# Patient Record
Sex: FEMALE | Race: OTHER | NOT HISPANIC OR LATINO
[De-identification: names, ages, dates, MRNs, and addresses within clinical notes are randomized per-mention and may not be internally consistent; named-entity substitution may affect disease eponyms.]

---

## 2020-03-24 ENCOUNTER — TRANSCRIPTION ENCOUNTER (OUTPATIENT)
Age: 60
End: 2020-03-24

## 2020-07-24 ENCOUNTER — APPOINTMENT (OUTPATIENT)
Dept: CARDIOLOGY | Facility: CLINIC | Age: 60
End: 2020-07-24

## 2021-01-26 ENCOUNTER — INPATIENT (INPATIENT)
Facility: HOSPITAL | Age: 61
LOS: 0 days | Discharge: HOME | DRG: 343 | End: 2021-01-27
Attending: SURGERY | Admitting: SURGERY
Payer: COMMERCIAL

## 2021-01-26 VITALS — HEIGHT: 63 IN | WEIGHT: 110.01 LBS

## 2021-01-26 DIAGNOSIS — Z88.0 ALLERGY STATUS TO PENICILLIN: ICD-10-CM

## 2021-01-26 DIAGNOSIS — K35.80 UNSPECIFIED ACUTE APPENDICITIS: ICD-10-CM

## 2021-01-26 DIAGNOSIS — K35.30 ACUTE APPENDICITIS WITH LOCALIZED PERITONITIS, WITHOUT PERFORATION OR GANGRENE: ICD-10-CM

## 2021-01-26 DIAGNOSIS — Z98.891 HISTORY OF UTERINE SCAR FROM PREVIOUS SURGERY: Chronic | ICD-10-CM

## 2021-01-26 LAB
ALBUMIN SERPL ELPH-MCNC: 4 G/DL — SIGNIFICANT CHANGE UP (ref 3.3–5)
ALP SERPL-CCNC: 88 U/L — SIGNIFICANT CHANGE UP (ref 40–120)
ALT FLD-CCNC: 26 U/L — SIGNIFICANT CHANGE UP (ref 12–78)
ANION GAP SERPL CALC-SCNC: 4 MMOL/L — LOW (ref 5–17)
APPEARANCE UR: CLEAR — SIGNIFICANT CHANGE UP
AST SERPL-CCNC: 17 U/L — SIGNIFICANT CHANGE UP (ref 15–37)
BASOPHILS # BLD AUTO: 0.03 K/UL — SIGNIFICANT CHANGE UP (ref 0–0.2)
BASOPHILS NFR BLD AUTO: 0.3 % — SIGNIFICANT CHANGE UP (ref 0–2)
BILIRUB SERPL-MCNC: 0.4 MG/DL — SIGNIFICANT CHANGE UP (ref 0.2–1.2)
BILIRUB UR-MCNC: NEGATIVE — SIGNIFICANT CHANGE UP
BUN SERPL-MCNC: 23 MG/DL — SIGNIFICANT CHANGE UP (ref 7–23)
CALCIUM SERPL-MCNC: 9.6 MG/DL — SIGNIFICANT CHANGE UP (ref 8.5–10.1)
CHLORIDE SERPL-SCNC: 107 MMOL/L — SIGNIFICANT CHANGE UP (ref 96–108)
CO2 SERPL-SCNC: 27 MMOL/L — SIGNIFICANT CHANGE UP (ref 22–31)
COLOR SPEC: YELLOW — SIGNIFICANT CHANGE UP
CREAT SERPL-MCNC: 0.78 MG/DL — SIGNIFICANT CHANGE UP (ref 0.5–1.3)
DIFF PNL FLD: ABNORMAL
EOSINOPHIL # BLD AUTO: 0.05 K/UL — SIGNIFICANT CHANGE UP (ref 0–0.5)
EOSINOPHIL NFR BLD AUTO: 0.4 % — SIGNIFICANT CHANGE UP (ref 0–6)
GLUCOSE SERPL-MCNC: 99 MG/DL — SIGNIFICANT CHANGE UP (ref 70–99)
GLUCOSE UR QL: NEGATIVE MG/DL — SIGNIFICANT CHANGE UP
HCT VFR BLD CALC: 41.3 % — SIGNIFICANT CHANGE UP (ref 34.5–45)
HGB BLD-MCNC: 13.6 G/DL — SIGNIFICANT CHANGE UP (ref 11.5–15.5)
IMM GRANULOCYTES NFR BLD AUTO: 0.3 % — SIGNIFICANT CHANGE UP (ref 0–1.5)
KETONES UR-MCNC: NEGATIVE — SIGNIFICANT CHANGE UP
LEUKOCYTE ESTERASE UR-ACNC: ABNORMAL
LIDOCAIN IGE QN: 97 U/L — SIGNIFICANT CHANGE UP (ref 73–393)
LYMPHOCYTES # BLD AUTO: 1.67 K/UL — SIGNIFICANT CHANGE UP (ref 1–3.3)
LYMPHOCYTES # BLD AUTO: 14.2 % — SIGNIFICANT CHANGE UP (ref 13–44)
MCHC RBC-ENTMCNC: 29.7 PG — SIGNIFICANT CHANGE UP (ref 27–34)
MCHC RBC-ENTMCNC: 32.9 GM/DL — SIGNIFICANT CHANGE UP (ref 32–36)
MCV RBC AUTO: 90.2 FL — SIGNIFICANT CHANGE UP (ref 80–100)
MONOCYTES # BLD AUTO: 0.58 K/UL — SIGNIFICANT CHANGE UP (ref 0–0.9)
MONOCYTES NFR BLD AUTO: 4.9 % — SIGNIFICANT CHANGE UP (ref 2–14)
NEUTROPHILS # BLD AUTO: 9.37 K/UL — HIGH (ref 1.8–7.4)
NEUTROPHILS NFR BLD AUTO: 79.9 % — HIGH (ref 43–77)
NITRITE UR-MCNC: NEGATIVE — SIGNIFICANT CHANGE UP
PH UR: 5 — SIGNIFICANT CHANGE UP (ref 5–8)
PLATELET # BLD AUTO: 247 K/UL — SIGNIFICANT CHANGE UP (ref 150–400)
POTASSIUM SERPL-MCNC: 3.9 MMOL/L — SIGNIFICANT CHANGE UP (ref 3.5–5.3)
POTASSIUM SERPL-SCNC: 3.9 MMOL/L — SIGNIFICANT CHANGE UP (ref 3.5–5.3)
PROT SERPL-MCNC: 7.9 GM/DL — SIGNIFICANT CHANGE UP (ref 6–8.3)
PROT UR-MCNC: NEGATIVE MG/DL — SIGNIFICANT CHANGE UP
RBC # BLD: 4.58 M/UL — SIGNIFICANT CHANGE UP (ref 3.8–5.2)
RBC # FLD: 13 % — SIGNIFICANT CHANGE UP (ref 10.3–14.5)
SODIUM SERPL-SCNC: 138 MMOL/L — SIGNIFICANT CHANGE UP (ref 135–145)
SP GR SPEC: 1.02 — SIGNIFICANT CHANGE UP (ref 1.01–1.02)
UROBILINOGEN FLD QL: NEGATIVE MG/DL — SIGNIFICANT CHANGE UP
WBC # BLD: 12.19 K/UL — HIGH (ref 3.8–10.5)
WBC # FLD AUTO: 12.19 K/UL — HIGH (ref 3.8–10.5)

## 2021-01-26 PROCEDURE — 86769 SARS-COV-2 COVID-19 ANTIBODY: CPT

## 2021-01-26 PROCEDURE — 85730 THROMBOPLASTIN TIME PARTIAL: CPT

## 2021-01-26 PROCEDURE — 93005 ELECTROCARDIOGRAM TRACING: CPT

## 2021-01-26 PROCEDURE — 36415 COLL VENOUS BLD VENIPUNCTURE: CPT

## 2021-01-26 PROCEDURE — 74177 CT ABD & PELVIS W/CONTRAST: CPT | Mod: 26

## 2021-01-26 PROCEDURE — C9290: CPT

## 2021-01-26 PROCEDURE — 71045 X-RAY EXAM CHEST 1 VIEW: CPT | Mod: 26

## 2021-01-26 PROCEDURE — 86803 HEPATITIS C AB TEST: CPT

## 2021-01-26 PROCEDURE — C1889: CPT

## 2021-01-26 PROCEDURE — 85610 PROTHROMBIN TIME: CPT

## 2021-01-26 PROCEDURE — 88304 TISSUE EXAM BY PATHOLOGIST: CPT

## 2021-01-26 RX ORDER — HEPARIN SODIUM 5000 [USP'U]/ML
5000 INJECTION INTRAVENOUS; SUBCUTANEOUS EVERY 12 HOURS
Refills: 0 | Status: DISCONTINUED | OUTPATIENT
Start: 2021-01-26 | End: 2021-01-27

## 2021-01-26 RX ORDER — METRONIDAZOLE 500 MG
500 TABLET ORAL ONCE
Refills: 0 | Status: COMPLETED | OUTPATIENT
Start: 2021-01-26 | End: 2021-01-26

## 2021-01-26 RX ORDER — METRONIDAZOLE 500 MG
500 TABLET ORAL EVERY 8 HOURS
Refills: 0 | Status: DISCONTINUED | OUTPATIENT
Start: 2021-01-26 | End: 2021-01-27

## 2021-01-26 RX ORDER — ONDANSETRON 8 MG/1
4 TABLET, FILM COATED ORAL EVERY 6 HOURS
Refills: 0 | Status: DISCONTINUED | OUTPATIENT
Start: 2021-01-26 | End: 2021-01-27

## 2021-01-26 RX ORDER — MORPHINE SULFATE 50 MG/1
4 CAPSULE, EXTENDED RELEASE ORAL ONCE
Refills: 0 | Status: DISCONTINUED | OUTPATIENT
Start: 2021-01-26 | End: 2021-01-26

## 2021-01-26 RX ORDER — CIPROFLOXACIN LACTATE 400MG/40ML
400 VIAL (ML) INTRAVENOUS EVERY 12 HOURS
Refills: 0 | Status: DISCONTINUED | OUTPATIENT
Start: 2021-01-27 | End: 2021-01-27

## 2021-01-26 RX ORDER — KETOROLAC TROMETHAMINE 30 MG/ML
15 SYRINGE (ML) INJECTION EVERY 6 HOURS
Refills: 0 | Status: DISCONTINUED | OUTPATIENT
Start: 2021-01-26 | End: 2021-01-27

## 2021-01-26 RX ORDER — SODIUM CHLORIDE 9 MG/ML
1000 INJECTION INTRAMUSCULAR; INTRAVENOUS; SUBCUTANEOUS ONCE
Refills: 0 | Status: COMPLETED | OUTPATIENT
Start: 2021-01-26 | End: 2021-01-26

## 2021-01-26 RX ORDER — CIPROFLOXACIN LACTATE 400MG/40ML
400 VIAL (ML) INTRAVENOUS ONCE
Refills: 0 | Status: COMPLETED | OUTPATIENT
Start: 2021-01-26 | End: 2021-01-26

## 2021-01-26 RX ORDER — ACETAMINOPHEN 500 MG
1000 TABLET ORAL ONCE
Refills: 0 | Status: DISCONTINUED | OUTPATIENT
Start: 2021-01-26 | End: 2021-01-27

## 2021-01-26 RX ORDER — SODIUM CHLORIDE 9 MG/ML
1000 INJECTION, SOLUTION INTRAVENOUS
Refills: 0 | Status: DISCONTINUED | OUTPATIENT
Start: 2021-01-26 | End: 2021-01-27

## 2021-01-26 RX ADMIN — MORPHINE SULFATE 4 MILLIGRAM(S): 50 CAPSULE, EXTENDED RELEASE ORAL at 20:45

## 2021-01-26 RX ADMIN — SODIUM CHLORIDE 1000 MILLILITER(S): 9 INJECTION INTRAMUSCULAR; INTRAVENOUS; SUBCUTANEOUS at 20:45

## 2021-01-26 RX ADMIN — Medication 100 MILLIGRAM(S): at 23:07

## 2021-01-26 NOTE — ED STATDOCS - PROGRESS NOTE DETAILS
PT. with acute appendicitis.  Surgery resident at bedside.  Admit to Dr. Manrique.  Nicole Stockton PA-C

## 2021-01-26 NOTE — ED ADULT NURSE NOTE - OBJECTIVE STATEMENT
pt ambulatory to ED, A&O x4. presents to ED with c/o slower abdominal pain. states pain began suddenly this afternoon. denies trauma or injury, N/V/D, dysuria, hematuria, fever, chills. no medication taken PTA.

## 2021-01-26 NOTE — ED STATDOCS - OBJECTIVE STATEMENT
61 y/o F with PMHx of  presenting to the ED c/o sudden onset lower abd pain starting this afternoon. Pain is non radiating. No recent trauma or injury. No medications taken for pain PTA. Denies any N/V/D, dysuria, hematuria, fever, chills.

## 2021-01-26 NOTE — PRE-OP CHECKLIST - DENTURES
Ok refill of Lisinopril for 1 month sent to [facility]. Pt needs non fasting OV for refills    Thanks,Maci     no

## 2021-01-26 NOTE — ED STATDOCS - MDM ORDERS SUBMITTED SELECTION
Normal vision: sees adequately in most situations; can see medication labels, newsprint Labs/Imaging Studies/Medications

## 2021-01-26 NOTE — H&P ADULT - NSHPPHYSICALEXAM_GEN_ALL_CORE
Vital Signs Last 24 Hrs  T(C): 36.9 (26 Jan 2021 23:11), Max: 37.1 (26 Jan 2021 19:30)  T(F): 98.4 (26 Jan 2021 23:11), Max: 98.7 (26 Jan 2021 19:30)  HR: 97 (26 Jan 2021 23:11) (90 - 97)  BP: 131/59 (26 Jan 2021 23:11) (131/59 - 137/57)  BP(mean): 80 (26 Jan 2021 19:30) (80 - 80)  RR: 20 (26 Jan 2021 23:11) (14 - 20)  SpO2: 99% (26 Jan 2021 23:11) (97% - 99%)    Physical Exam:  General: AAOx3, in NAD, comfortable  HEENT: NC/AT, EOMI   Cardio: S1S2, RRR  Pulm: equal air entry b/l, non labored on RA  Abdomen: soft, non distended, non peritoneal, tenderness in RLQ, no rebound or guarding noted  Extremities: FROM x4

## 2021-01-26 NOTE — H&P ADULT - ASSESSMENT
61 yo F with abdominal pain, found to have acute appendicitis on CT.    Plan:  -admit under Dr. Manrique  -NPO  -IVF  -IV abx  -rapid COVID  -plan for laparoscopic appendectomy, possible open    Plan discussed with Dr. Manrique

## 2021-01-26 NOTE — ED STATDOCS - ATTENDING CONTRIBUTION TO CARE
I, Elvira Auguste MD,  performed the initial face to face bedside interview with this patient regarding history of present illness, review of symptoms and relevant past medical, social and family history.  I completed an independent physical examination.  I was the initial provider who evaluated this patient. I have signed out the follow up of any pending tests (i.e. labs, radiological studies) to the ACP.  I have communicated the patient’s plan of care and disposition with the ACP.  The history, relevant review of systems, past medical and surgical history, medical decision making, and physical examination was documented by the scribe in my presence and I attest to the accuracy of the documentation.

## 2021-01-26 NOTE — H&P ADULT - HISTORY OF PRESENT ILLNESS
Pt is a 59 yo F who presents to  ED with complaints of lower abdominal pain on the right that began this afternoon around lunch. States pain is 5/10 in severity. Denies associated fevers, chills, nausea or vomiting. But notes that she had loss of appetite and was only able to eat a small amount of food at 6pm. Never had this pain before.

## 2021-01-27 ENCOUNTER — TRANSCRIPTION ENCOUNTER (OUTPATIENT)
Age: 61
End: 2021-01-27

## 2021-01-27 ENCOUNTER — RESULT REVIEW (OUTPATIENT)
Age: 61
End: 2021-01-27

## 2021-01-27 VITALS
DIASTOLIC BLOOD PRESSURE: 49 MMHG | TEMPERATURE: 98 F | RESPIRATION RATE: 16 BRPM | OXYGEN SATURATION: 100 % | SYSTOLIC BLOOD PRESSURE: 126 MMHG | HEART RATE: 84 BPM

## 2021-01-27 LAB
APTT BLD: 28.5 SEC — SIGNIFICANT CHANGE UP (ref 27.5–35.5)
CULTURE RESULTS: SIGNIFICANT CHANGE UP
HCV AB S/CO SERPL IA: 0.14 S/CO — SIGNIFICANT CHANGE UP (ref 0–0.99)
HCV AB SERPL-IMP: SIGNIFICANT CHANGE UP
INR BLD: 1.07 RATIO — SIGNIFICANT CHANGE UP (ref 0.88–1.16)
PROTHROM AB SERPL-ACNC: 12.5 SEC — SIGNIFICANT CHANGE UP (ref 10.6–13.6)
SARS-COV-2 RNA SPEC QL NAA+PROBE: SIGNIFICANT CHANGE UP
SPECIMEN SOURCE: SIGNIFICANT CHANGE UP

## 2021-01-27 PROCEDURE — 88304 TISSUE EXAM BY PATHOLOGIST: CPT | Mod: 26

## 2021-01-27 PROCEDURE — 93010 ELECTROCARDIOGRAM REPORT: CPT

## 2021-01-27 RX ORDER — OXYCODONE HYDROCHLORIDE 5 MG/1
5 TABLET ORAL EVERY 6 HOURS
Refills: 0 | Status: DISCONTINUED | OUTPATIENT
Start: 2021-01-27 | End: 2021-01-27

## 2021-01-27 RX ORDER — ONDANSETRON 8 MG/1
4 TABLET, FILM COATED ORAL ONCE
Refills: 0 | Status: DISCONTINUED | OUTPATIENT
Start: 2021-01-27 | End: 2021-01-27

## 2021-01-27 RX ORDER — FENTANYL CITRATE 50 UG/ML
50 INJECTION INTRAVENOUS
Refills: 0 | Status: DISCONTINUED | OUTPATIENT
Start: 2021-01-27 | End: 2021-01-27

## 2021-01-27 RX ORDER — SODIUM CHLORIDE 9 MG/ML
1000 INJECTION, SOLUTION INTRAVENOUS
Refills: 0 | Status: DISCONTINUED | OUTPATIENT
Start: 2021-01-27 | End: 2021-01-27

## 2021-01-27 RX ORDER — OXYCODONE HYDROCHLORIDE 5 MG/1
10 TABLET ORAL ONCE
Refills: 0 | Status: DISCONTINUED | OUTPATIENT
Start: 2021-01-27 | End: 2021-01-27

## 2021-01-27 RX ORDER — ACETAMINOPHEN 500 MG
650 TABLET ORAL EVERY 6 HOURS
Refills: 0 | Status: DISCONTINUED | OUTPATIENT
Start: 2021-01-27 | End: 2021-01-27

## 2021-01-27 RX ADMIN — SODIUM CHLORIDE 100 MILLILITER(S): 9 INJECTION, SOLUTION INTRAVENOUS at 09:12

## 2021-01-27 RX ADMIN — Medication 200 MILLIGRAM(S): at 00:43

## 2021-01-27 RX ADMIN — SODIUM CHLORIDE 100 MILLILITER(S): 9 INJECTION, SOLUTION INTRAVENOUS at 02:44

## 2021-01-27 RX ADMIN — Medication 100 MILLIGRAM(S): at 06:34

## 2021-01-27 NOTE — DISCHARGE NOTE NURSING/CASE MANAGEMENT/SOCIAL WORK - PATIENT PORTAL LINK FT
You can access the FollowMyHealth Patient Portal offered by [facility] by registering at the following website: [email or web address]. By joining [facility]’s FollowMyHealth portal, you will also be able to view your health information using other applications (apps) compatible with our system.

## 2021-01-27 NOTE — BRIEF OPERATIVE NOTE - NSICDXBRIEFPOSTOP_GEN_ALL_CORE_FT
POST-OP DIAGNOSIS:  Acute appendicitis with localized peritonitis 27-Jan-2021 13:49:42  Crispin Davis

## 2021-01-27 NOTE — DISCHARGE NOTE PROVIDER - CARE PROVIDER_API CALL
Jeff Manrique)  Surgery  [address], [address]  [place] [address]  Phone: [phone]  Fax: [phone]  Follow Up Time: 1 week

## 2021-01-27 NOTE — DISCHARGE NOTE PROVIDER - NSDCMRMEDTOKEN_GEN_ALL_CORE_FT
oxycodone-acetaminophen 5 mg-325 mg oral tablet: 1-2 tab(s) orally every 4-6 hours, As Needed -for moderate pain MDD:4gm

## 2021-01-27 NOTE — DISCHARGE NOTE PROVIDER - HOSPITAL COURSE
Patient admitted with acute appendicitis. Given IV antibiotics and taken for laparoscopic appendectomy. Appendix was non-perforated. For routine discharge home. Patient admitted with acute appendicitis with localized peritonitis. Given IV antibiotics and taken for laparoscopic appendectomy. Appendix was non-perforated. For routine discharge home.

## 2021-01-27 NOTE — BRIEF OPERATIVE NOTE - NSICDXBRIEFPREOP_GEN_ALL_CORE_FT
PRE-OP DIAGNOSIS:  Acute appendicitis with localized peritonitis 27-Jan-2021 13:49:31  Crispin Davis

## 2021-02-01 ENCOUNTER — APPOINTMENT (OUTPATIENT)
Dept: CARDIOLOGY | Facility: CLINIC | Age: 61
End: 2021-02-01

## 2021-04-16 PROBLEM — Z78.9 OTHER SPECIFIED HEALTH STATUS: Chronic | Status: ACTIVE | Noted: 2021-01-26

## 2021-04-26 ENCOUNTER — NON-APPOINTMENT (OUTPATIENT)
Age: 61
End: 2021-04-26

## 2021-04-26 ENCOUNTER — APPOINTMENT (OUTPATIENT)
Dept: CARDIOLOGY | Facility: CLINIC | Age: 61
End: 2021-04-26
Payer: COMMERCIAL

## 2021-04-26 VITALS
WEIGHT: 117 LBS | TEMPERATURE: 97.9 F | HEIGHT: 63 IN | BODY MASS INDEX: 20.73 KG/M2 | SYSTOLIC BLOOD PRESSURE: 115 MMHG | DIASTOLIC BLOOD PRESSURE: 76 MMHG | HEART RATE: 86 BPM | OXYGEN SATURATION: 99 %

## 2021-04-26 DIAGNOSIS — Z85.72 PERSONAL HISTORY OF NON-HODGKIN LYMPHOMAS: ICD-10-CM

## 2021-04-26 PROCEDURE — 99072 ADDL SUPL MATRL&STAF TM PHE: CPT

## 2021-04-26 PROCEDURE — 99204 OFFICE O/P NEW MOD 45 MIN: CPT | Mod: 25

## 2021-04-26 PROCEDURE — 93000 ELECTROCARDIOGRAM COMPLETE: CPT

## 2021-04-26 NOTE — ASSESSMENT
[FreeTextEntry1] : Patient with above hx\par \par left sided chest pain , with associated symptoms  started a day after physical activity  atypical for angina ,with new abnormal EKG RBBB  would recommend echo to assess ventricular function , exercise nuclear stress test \par \par Abnormal EKG   new RBBB compared old ekg done in 2013 , will obtain echo , \par \par \par Pure hypercholesteremia    HDL 68  would recommend diet restriction , will consider starting her on statin  if there any evidence of atherosclerosis ( carotid doppler ),\par \par will follow up after above test ,

## 2021-04-26 NOTE — HISTORY OF PRESENT ILLNESS
[FreeTextEntry1] : 61 year old female with  hx of Hyperlipidemia  , treated NHL with chemotherapy with radiation 1996 ,came for cardiac evaluation  with complain of having left sided chest discomfort  as per patient who was mopping the floor a day before she was having pain the next day  which she describes it like mild tiredness feeling with shortness of breath with fatigue , lasted for one day , resolved on its own \par not related to exertion .  patient says she was feeling tired lately , denies any fever or chills \par \par Patient blood work showed  showed elevated

## 2021-05-03 ENCOUNTER — APPOINTMENT (OUTPATIENT)
Dept: CARDIOLOGY | Facility: CLINIC | Age: 61
End: 2021-05-03
Payer: COMMERCIAL

## 2021-05-03 PROCEDURE — 99072 ADDL SUPL MATRL&STAF TM PHE: CPT

## 2021-05-03 PROCEDURE — 93306 TTE W/DOPPLER COMPLETE: CPT

## 2021-05-27 ENCOUNTER — APPOINTMENT (OUTPATIENT)
Dept: CARDIOLOGY | Facility: CLINIC | Age: 61
End: 2021-05-27
Payer: COMMERCIAL

## 2021-05-27 PROCEDURE — 93015 CV STRESS TEST SUPVJ I&R: CPT

## 2021-05-27 PROCEDURE — 99072 ADDL SUPL MATRL&STAF TM PHE: CPT

## 2021-05-27 PROCEDURE — A9500: CPT

## 2021-05-27 PROCEDURE — 78452 HT MUSCLE IMAGE SPECT MULT: CPT

## 2021-06-03 ENCOUNTER — APPOINTMENT (OUTPATIENT)
Dept: CARDIOLOGY | Facility: CLINIC | Age: 61
End: 2021-06-03
Payer: COMMERCIAL

## 2021-06-03 VITALS
WEIGHT: 115 LBS | DIASTOLIC BLOOD PRESSURE: 70 MMHG | SYSTOLIC BLOOD PRESSURE: 118 MMHG | HEIGHT: 63 IN | OXYGEN SATURATION: 99 % | HEART RATE: 80 BPM | BODY MASS INDEX: 20.38 KG/M2

## 2021-06-03 PROCEDURE — 99072 ADDL SUPL MATRL&STAF TM PHE: CPT

## 2021-06-03 PROCEDURE — 99214 OFFICE O/P EST MOD 30 MIN: CPT

## 2021-06-03 NOTE — HISTORY OF PRESENT ILLNESS
[FreeTextEntry1] : 61 year old female with  hx of Hyperlipidemia  , treated NHL with chemotherapy with radiation 1996 ,came for follow up says she is doing  well , patient had recommended tests ,  echo showed MVP  moderate MR , mild to moderate AI  normal LVEF , patient normal nuclear stress test .\par \par \par \par Patient blood work showed  showed elevated

## 2021-06-03 NOTE — CARDIOLOGY SUMMARY
[de-identified] :  sinus rhythm RBBB right axis  [de-identified] : 5/27/21 8 METS 92% MPHR normal myocardial perfusion scan  EF 70% [de-identified] : 5/3/21  EF 59%  MVP Of anterior mitral leaflet Moderate MR ,mild to moderate AI,Mild TR RVSP 30 mm hg ,  oral

## 2021-06-03 NOTE — REASON FOR VISIT
[Symptom and Test Evaluation] : symptom and test evaluation [Arrhythmia/ECG Abnorrmalities] : arrhythmia/ECG abnormalities [Structural Heart and Valve Disease] : structural heart and valve disease [Hyperlipidemia] : hyperlipidemia [FreeTextEntry3] : Dr Damian

## 2021-12-03 ENCOUNTER — APPOINTMENT (OUTPATIENT)
Dept: CARDIOLOGY | Facility: CLINIC | Age: 61
End: 2021-12-03
Payer: COMMERCIAL

## 2021-12-03 ENCOUNTER — NON-APPOINTMENT (OUTPATIENT)
Age: 61
End: 2021-12-03

## 2021-12-03 VITALS
WEIGHT: 109 LBS | BODY MASS INDEX: 19.31 KG/M2 | HEIGHT: 63 IN | HEART RATE: 84 BPM | OXYGEN SATURATION: 99 % | SYSTOLIC BLOOD PRESSURE: 109 MMHG | DIASTOLIC BLOOD PRESSURE: 72 MMHG

## 2021-12-03 PROCEDURE — 93000 ELECTROCARDIOGRAM COMPLETE: CPT

## 2021-12-03 PROCEDURE — 99214 OFFICE O/P EST MOD 30 MIN: CPT

## 2021-12-03 NOTE — HISTORY OF PRESENT ILLNESS
[FreeTextEntry1] : 61 year old female with  hx of Hyperlipidemia  , treated NHL with chemotherapy with radiation 1996 ,came for follow up says she is is tired most of the time , patient some times sob on exertion without chest pain , patient has sedentary life style  , does not do much physical activity , does not sleep , patient had recent blood work , was told to have normal TSH , normal blood count ,   patient does give hx of snoring , \par \par patient denies any leg edema \par \par \par \par ,  echo showed MVP  moderate MR , mild to moderate AI  normal LVEF , patient normal nuclear stress test .\par \par \par \par Patient blood work showed  showed elevated

## 2021-12-03 NOTE — CARDIOLOGY SUMMARY
[de-identified] :  12/3/21 sinus rhythm RBBB right axis  [de-identified] : 5/27/21 8 METS 92% MPHR normal myocardial perfusion scan  EF 70% [de-identified] : 5/3/21  EF 59%  MVP Of anterior mitral leaflet Moderate MR ,mild to moderate AI,Mild TR RVSP 30 mm hg ,

## 2021-12-03 NOTE — ASSESSMENT
[FreeTextEntry1] : Patient with above hx\par \par Fatigue  SOB possible decondition  sleep deprivation , rule out sleep apnea ,  will obtain crp , esr rheumatoid   will obtain echo   sleep study \par \par \par Abnormal EKG    RBBB compared old ekg done in 2013 ,Normal ventricular function , normal stress test  , stable\par \par MVP , moderate MR , mild to moderate AI clinically compensated , will continue to monitor severity with periodic echoes , \par \par \par Pure hypercholesteremia    HDL 68  would recommend diet restriction , no evidence of carotid disease   \par \par 1 month \par \par

## 2022-04-28 ENCOUNTER — NON-APPOINTMENT (OUTPATIENT)
Age: 62
End: 2022-04-28

## 2022-04-28 ENCOUNTER — APPOINTMENT (OUTPATIENT)
Dept: CARDIOLOGY | Facility: CLINIC | Age: 62
End: 2022-04-28
Payer: COMMERCIAL

## 2022-04-28 VITALS
BODY MASS INDEX: 18.61 KG/M2 | DIASTOLIC BLOOD PRESSURE: 60 MMHG | HEART RATE: 88 BPM | HEIGHT: 63 IN | WEIGHT: 105 LBS | SYSTOLIC BLOOD PRESSURE: 112 MMHG | OXYGEN SATURATION: 99 %

## 2022-04-28 PROCEDURE — 93000 ELECTROCARDIOGRAM COMPLETE: CPT

## 2022-04-28 PROCEDURE — 99214 OFFICE O/P EST MOD 30 MIN: CPT

## 2022-04-28 NOTE — ASSESSMENT
[FreeTextEntry1] : Patient with above  multiple symptoms  non specific , constantly worried ? anxiety  \par \par Fatigue  SOB possible decondition  sleep deprivation , rule out sleep apnea ,  will obtain crp , esr rheumatoid    sleep study   will obtain zio patch to rule out arrhythmia  for 2 weeks \par \par \par Abnormal EKG    RBBB compared old ekg done in 2013 ,Normal ventricular function , normal stress test  , stable\par \par MVP , moderate MR , mild to moderate AI clinically compensated , will continue to monitor severity with periodic echoes , \par \par \par Pure hypercholesteremia    HDL 68  would recommend diet restriction , no evidence of carotid disease   \par \par \par \par

## 2022-04-28 NOTE — REVIEW OF SYSTEMS
[Negative] : Heme/Lymph [Chills] : no chills [Blurry Vision] : no blurred vision [Earache] : no earache [SOB] : no shortness of breath [Leg Claudication] : no intermittent leg claudication [Syncope] : no syncope [Cough] : no cough [Abdominal Pain] : no abdominal pain [Vomiting] : no vomiting [Heartburn] : no heartburn [Change in Appetite] : no change in appetite [Dysphagia] : no dysphagia [Joint Swelling] : no joint swelling [Itching] : no itching

## 2022-04-28 NOTE — CARDIOLOGY SUMMARY
[de-identified] : 4/28/22  sinus rhythm RBBB right axis  [de-identified] : 5/27/21 8 METS 92% MPHR normal myocardial perfusion scan  EF 70% [de-identified] : 5/3/21  EF 59%  MVP Of anterior mitral leaflet Moderate MR ,mild to moderate AI,Mild TR RVSP 30 mm hg ,  [de-identified] : 6/7/21  mild intimal thickening

## 2022-04-28 NOTE — PHYSICAL EXAM
[Well Developed] : well developed [Well Nourished] : well nourished [No Acute Distress] : no acute distress [Normal Conjunctiva] : normal conjunctiva [Normal Venous Pressure] : normal venous pressure [No Carotid Bruit] : no carotid bruit [Normal S1, S2] : normal S1, S2 [No Murmur] : no murmur [No Rub] : no rub [No Gallop] : no gallop [Clear Lung Fields] : clear lung fields [Good Air Entry] : good air entry [No Respiratory Distress] : no respiratory distress  [Soft] : abdomen soft [Non Tender] : non-tender [Normal Bowel Sounds] : normal bowel sounds [Normal Gait] : normal gait [No Edema] : no edema [No Cyanosis] : no cyanosis [No Clubbing] : no clubbing [No Varicosities] : no varicosities [No Rash] : no rash [No Skin Lesions] : no skin lesions [Moves all extremities] : moves all extremities [No Focal Deficits] : no focal deficits [Normal Speech] : normal speech [Alert and Oriented] : alert and oriented [Normal memory] : normal memory [Normal Radial B/L] : normal radial B/L [Normal PT B/L] : normal PT B/L [Normal DP B/L] : normal DP B/L

## 2022-04-28 NOTE — HISTORY OF PRESENT ILLNESS
[FreeTextEntry1] : 61 year old female with  hx of Hyperlipidemia  , treated NHL with chemotherapy with radiation 1996 ,came for follow up says she is is tired most of the time , patient some times sob on exertion without chest pain  occurs at rest  patient has sedentary life style  , does not do much physical activity , does not sleep well  ,  patient also feels palpitations  . under stress , constantly worried  , patient had these symptoms for long time , \par \par \par patient had recent blood work , was told to have normal TSH , normal blood count ,   patient does give hx of snoring , \par \par patient denies any leg edema  but does feel cold in extremities \par \par echo showed MVP  moderate MR , mild to moderate AI  normal LVEF , patient normal nuclear stress test .\par \par Patient blood work showed  showed elevated

## 2022-05-10 ENCOUNTER — APPOINTMENT (OUTPATIENT)
Dept: CARDIOLOGY | Facility: CLINIC | Age: 62
End: 2022-05-10
Payer: COMMERCIAL

## 2022-05-10 PROCEDURE — 93306 TTE W/DOPPLER COMPLETE: CPT

## 2022-06-01 ENCOUNTER — OUTPATIENT (OUTPATIENT)
Dept: OUTPATIENT SERVICES | Facility: HOSPITAL | Age: 62
LOS: 1 days | End: 2022-06-01
Payer: COMMERCIAL

## 2022-06-01 ENCOUNTER — APPOINTMENT (OUTPATIENT)
Age: 62
End: 2022-06-01
Payer: COMMERCIAL

## 2022-06-01 DIAGNOSIS — Z98.891 HISTORY OF UTERINE SCAR FROM PREVIOUS SURGERY: Chronic | ICD-10-CM

## 2022-06-01 DIAGNOSIS — G47.33 OBSTRUCTIVE SLEEP APNEA (ADULT) (PEDIATRIC): ICD-10-CM

## 2022-06-01 PROCEDURE — 95800 SLP STDY UNATTENDED: CPT

## 2022-06-08 NOTE — PHYSICAL EXAM
[Well Developed] : well developed [Well Nourished] : well nourished [No Acute Distress] : no acute distress [Normal Conjunctiva] : normal conjunctiva [Normal Venous Pressure] : normal venous pressure [No Carotid Bruit] : no carotid bruit [Normal S1, S2] : normal S1, S2 [No Murmur] : no murmur [No Rub] : no rub [No Gallop] : no gallop [Clear Lung Fields] : clear lung fields [Good Air Entry] : good air entry [No Respiratory Distress] : no respiratory distress  [Soft] : abdomen soft [Non Tender] : non-tender [Normal Bowel Sounds] : normal bowel sounds [Normal Gait] : normal gait [No Edema] : no edema [No Cyanosis] : no cyanosis [No Clubbing] : no clubbing [No Varicosities] : no varicosities [Normal Radial B/L] : normal radial B/L [Normal PT B/L] : normal PT B/L [Normal DP B/L] : normal DP B/L [No Rash] : no rash [No Skin Lesions] : no skin lesions [Moves all extremities] : moves all extremities [No Focal Deficits] : no focal deficits [Normal Speech] : normal speech [Alert and Oriented] : alert and oriented [Normal memory] : normal memory

## 2022-06-10 ENCOUNTER — APPOINTMENT (OUTPATIENT)
Dept: CARDIOLOGY | Facility: CLINIC | Age: 62
End: 2022-06-10
Payer: COMMERCIAL

## 2022-06-10 VITALS
WEIGHT: 100 LBS | OXYGEN SATURATION: 97 % | BODY MASS INDEX: 17.72 KG/M2 | DIASTOLIC BLOOD PRESSURE: 74 MMHG | HEIGHT: 63 IN | HEART RATE: 87 BPM | SYSTOLIC BLOOD PRESSURE: 122 MMHG

## 2022-06-10 PROCEDURE — 93000 ELECTROCARDIOGRAM COMPLETE: CPT

## 2022-06-10 PROCEDURE — 99214 OFFICE O/P EST MOD 30 MIN: CPT

## 2022-06-10 NOTE — ASSESSMENT
[FreeTextEntry1] : Patient with above  multiple symptoms  non specific , constantly worried ? anxiety  \par \par \par symptomatic PSVT PAC will give low BB atenolol 12.5 mg po daily \par \par Fatigue  SOB possible decondition  sleep deprivation ,  sleep study mild Sleep apnea , brief  SVT on monitor \par \par Abnormal EKG    RBBB compared old ekg done in 2013 ,Normal ventricular function , normal stress test  , stable\par \par MVP , moderate MR , mild to moderate AI clinically compensated ,stable on recent echo will continue to monitor severity with periodic echoes , \par \par \par Pure hypercholesteremia   HDL 63   would recommend diet restriction , no evidence of carotid disease \par \par follow up after 3 months   \par \par \par \par

## 2022-06-10 NOTE — CARDIOLOGY SUMMARY
[de-identified] : 4/28/22  sinus rhythm RBBB right axis  [de-identified] : zio  sinus rhythm rare PACS  brief SVT   had symptoms around that time    rare PVCS  [de-identified] : 5/27/21 8 METS 92% MPHR normal myocardial perfusion scan  EF 70% [de-identified] : 5/10/22 EF 55-60 %  MVP Of anterior mitral leaflet Moderate MR ,mild to moderate AI,Mild TR RVSP 30 mm hg ,  [de-identified] : 6/7/21  mild intimal thickening

## 2022-06-10 NOTE — HISTORY OF PRESENT ILLNESS
[FreeTextEntry1] : 61 year old female with  hx of Hyperlipidemia  , treated NHL with chemotherapy with radiation 1996 ,came for follow up says she is is tired most of the time , patient some times sob on exertion without chest pain  occurs at rest  patient has sedentary life style  , does not do much physical activity , does not sleep well  ,  patient also feels palpitations  . under stress , constantly worried  , patient had these symptoms for long time , \par \par \par patient had recent blood work , was told to have normal TSH , normal blood count ,   patient does give hx of snoring ,  sleep study showed mild sleep apnea , patient  zio patch showed  sinus rhythm rare PACS , brief SVT associated with her symptoms  of not feeling comfortable \par \par patient denies any leg edema  but does feel cold in extremities \par \par patient had echo showed MVP  moderate MR , mild to moderate AI  normal LVEF without significant change from prior , patient normal nuclear stress test .\par \par Patient blood work showed  showed elevated

## 2022-09-09 ENCOUNTER — NON-APPOINTMENT (OUTPATIENT)
Age: 62
End: 2022-09-09

## 2022-09-09 ENCOUNTER — APPOINTMENT (OUTPATIENT)
Dept: CARDIOLOGY | Facility: CLINIC | Age: 62
End: 2022-09-09

## 2022-09-09 VITALS
DIASTOLIC BLOOD PRESSURE: 56 MMHG | SYSTOLIC BLOOD PRESSURE: 102 MMHG | HEART RATE: 80 BPM | HEIGHT: 63 IN | WEIGHT: 106 LBS | BODY MASS INDEX: 18.78 KG/M2 | OXYGEN SATURATION: 99 %

## 2022-09-09 DIAGNOSIS — R53.83 OTHER FATIGUE: ICD-10-CM

## 2022-09-09 PROCEDURE — 93000 ELECTROCARDIOGRAM COMPLETE: CPT

## 2022-09-09 PROCEDURE — 99214 OFFICE O/P EST MOD 30 MIN: CPT | Mod: 25

## 2022-09-09 NOTE — ASSESSMENT
[FreeTextEntry1] : Patient with above  multiple symptoms  non specific , constantly worried ? anxiety  improved symptoms \par \par \par symptomatic PSVT PAC    improved symptoms with BB   will continue  atenolol 12.5 mg po daily \par \par Fatigue  SOB possible decondition  sleep deprivation ,  sleep study mild Sleep apnea , brief  SVT on monitor \par \par Abnormal EKG    RBBB compared old ekg done in 2013 ,Normal ventricular function , normal stress test  , stable\par \par MVP , moderate MR , mild to moderate AI clinically compensated ,stable on recent echo will continue to monitor severity with periodic echoes , \par \par Pure hypercholesteremia   HDL 63   10 years risk < 3%   would recommend diet restriction , no evidence of carotid disease \par \par follow up after 4 months   \par \par \par \par

## 2022-09-09 NOTE — HISTORY OF PRESENT ILLNESS
[FreeTextEntry1] : Patient  with  hx of Hyperlipidemia  , treated NHL with chemotherapy with radiation 1996 ,came for follow up says she is feeling better than before \par \par patient is sleeping well , increase fluid intake ,  improved palpitations with medication  able to sleep well , less worried than before , \par \par patient had recent blood work , was told to have normal TSH , normal blood count ,elevated    normal TG    patient does give hx of snoring ,  sleep study showed mild sleep apnea , patient  zio patch showed  sinus rhythm rare PACS , brief SVT associated with her symptoms  of not feeling comfortable   her blood pressure is low normal range \par \par patient denies any leg edema  but does feel cold in extremities \par \par patient had echo showed MVP  moderate MR , mild to moderate AI  normal LVEF without significant change from prior , patient normal nuclear stress test .\par \par

## 2022-09-09 NOTE — CARDIOLOGY SUMMARY
[de-identified] : 9/9/22  sinus rhythm RBBB right axis  [de-identified] : zio  sinus rhythm rare PACS  brief SVT   had symptoms around that time    rare PVCS  [de-identified] : 5/27/21 8 METS 92% MPHR normal myocardial perfusion scan  EF 70% [de-identified] : 5/10/22 EF 55-60 %  MVP Of anterior mitral leaflet Moderate MR ,mild to moderate AI,Mild TR RVSP 30 mm hg ,  [de-identified] : 6/7/21  mild intimal thickening

## 2022-12-29 ENCOUNTER — RX RENEWAL (OUTPATIENT)
Age: 62
End: 2022-12-29

## 2022-12-29 RX ORDER — ATENOLOL 25 MG/1
25 TABLET ORAL
Qty: 45 | Refills: 1 | Status: DISCONTINUED | COMMUNITY
Start: 2022-06-10 | End: 2022-12-29

## 2023-01-13 ENCOUNTER — NON-APPOINTMENT (OUTPATIENT)
Age: 63
End: 2023-01-13

## 2023-01-13 ENCOUNTER — APPOINTMENT (OUTPATIENT)
Dept: CARDIOLOGY | Facility: CLINIC | Age: 63
End: 2023-01-13
Payer: COMMERCIAL

## 2023-01-13 VITALS
SYSTOLIC BLOOD PRESSURE: 116 MMHG | HEIGHT: 63 IN | OXYGEN SATURATION: 96 % | HEART RATE: 81 BPM | DIASTOLIC BLOOD PRESSURE: 68 MMHG | WEIGHT: 110 LBS | BODY MASS INDEX: 19.49 KG/M2

## 2023-01-13 VITALS
DIASTOLIC BLOOD PRESSURE: 68 MMHG | RESPIRATION RATE: 14 BRPM | WEIGHT: 110 LBS | HEART RATE: 81 BPM | BODY MASS INDEX: 19.49 KG/M2 | OXYGEN SATURATION: 96 % | HEIGHT: 63 IN | SYSTOLIC BLOOD PRESSURE: 116 MMHG | TEMPERATURE: 98 F

## 2023-01-13 PROCEDURE — 93000 ELECTROCARDIOGRAM COMPLETE: CPT

## 2023-01-13 PROCEDURE — 99214 OFFICE O/P EST MOD 30 MIN: CPT | Mod: 25

## 2023-01-13 NOTE — CARDIOLOGY SUMMARY
[de-identified] : 1/13/23 sinus rhythm RBBB right axis  [de-identified] : zio  sinus rhythm rare PACS  brief SVT   had symptoms around that time    rare PVCS  [de-identified] : 5/27/21 8 METS 92% MPHR normal myocardial perfusion scan  EF 70% [de-identified] : 5/10/22 EF 55-60 %  MVP Of anterior mitral leaflet Moderate MR ,mild to moderate AI,Mild TR RVSP 30 mm hg ,  [de-identified] : 6/7/21  mild intimal thickening

## 2023-06-22 ENCOUNTER — APPOINTMENT (OUTPATIENT)
Dept: CARDIOLOGY | Facility: CLINIC | Age: 63
End: 2023-06-22

## 2023-08-09 ENCOUNTER — RESULT CHARGE (OUTPATIENT)
Age: 63
End: 2023-08-09

## 2023-08-10 ENCOUNTER — APPOINTMENT (OUTPATIENT)
Dept: CARDIOLOGY | Facility: CLINIC | Age: 63
End: 2023-08-10
Payer: COMMERCIAL

## 2023-08-10 VITALS
WEIGHT: 109 LBS | TEMPERATURE: 98 F | OXYGEN SATURATION: 98 % | BODY MASS INDEX: 19.31 KG/M2 | RESPIRATION RATE: 14 BRPM | HEIGHT: 63 IN | DIASTOLIC BLOOD PRESSURE: 64 MMHG | SYSTOLIC BLOOD PRESSURE: 118 MMHG | HEART RATE: 84 BPM

## 2023-08-10 VITALS
WEIGHT: 109 LBS | SYSTOLIC BLOOD PRESSURE: 118 MMHG | OXYGEN SATURATION: 98 % | BODY MASS INDEX: 19.31 KG/M2 | HEART RATE: 84 BPM | DIASTOLIC BLOOD PRESSURE: 64 MMHG | HEIGHT: 63 IN

## 2023-08-10 DIAGNOSIS — R06.02 SHORTNESS OF BREATH: ICD-10-CM

## 2023-08-10 PROCEDURE — 99214 OFFICE O/P EST MOD 30 MIN: CPT | Mod: 25

## 2023-08-10 PROCEDURE — 93000 ELECTROCARDIOGRAM COMPLETE: CPT

## 2023-08-10 NOTE — CARDIOLOGY SUMMARY
[de-identified] : 1/13/23 sinus rhythm RBBB right axis  [de-identified] : zio  sinus rhythm rare PACS  brief SVT   had symptoms around that time    rare PVCS  [de-identified] : 5/27/21 8 METS 92% MPHR normal myocardial perfusion scan  EF 70% [de-identified] : 5/10/22 EF 55-60 %  MVP Of anterior mitral leaflet Moderate MR ,mild to moderate AI,Mild TR RVSP 30 mm hg ,  [de-identified] : 6/7/21  mild intimal thickening

## 2023-08-10 NOTE — HISTORY OF PRESENT ILLNESS
[FreeTextEntry1] : Patient  with  hx of Hyperlipidemia  , treated NHL with chemotherapy with radiation 1996 ,came for follow up says she is feeling same as before , feels tired after she is done activity , not while doing the activity , does feel palpitation   patient is sleeping well , increase fluid intake ,  improved palpitations with medication  able to sleep well , less worried than before ,   patient had recent blood work , was told to have normal TSH , normal blood count ,elevated    normal TG    patient does give hx of snoring ,  sleep study showed mild sleep apnea , patient  zio patch showed  sinus rhythm rare PACS , brief SVT associated with her symptoms  of not feeling comfortable   her blood pressure is low normal range   patient denies any leg edema  but does feel cold in extremities   patient had echo showed MVP  moderate MR , mild to moderate AI  normal LVEF without significant change from prior , patient normal nuclear stress test .in 2021

## 2023-08-10 NOTE — ASSESSMENT
[FreeTextEntry1] : Patient with above  multiple symptoms  non specific , constantly worried ? anxiety related    symptomatic PSVT PAC s with palpitation  symptoms with BB   will increase  atenolol 25  mg po daily  will obtain stress test   Fatigue  SOB possible decondition  sleep deprivation ,  sleep study mild Sleep apnea , brief  SVT on monitor   Abnormal EKG    RBBB compared old ekg done in 2013 ,Normal ventricular function , normal stress test  , stable  MVP , moderate MR , mild to moderate AI clinically compensated  will obtain follow up echo   Pure hypercholesteremia   HDL 63   10 years risk < 3%   would recommend diet restriction , no evidence of carotid disease   follow up after 3 months

## 2023-08-10 NOTE — REVIEW OF SYSTEMS
[Chills] : no chills [Blurry Vision] : no blurred vision [Earache] : no earache [SOB] : no shortness of breath [Leg Claudication] : no intermittent leg claudication [Syncope] : no syncope [Cough] : no cough [Abdominal Pain] : no abdominal pain [Vomiting] : no vomiting [Heartburn] : no heartburn [Change in Appetite] : no change in appetite [Dysphagia] : no dysphagia [Joint Swelling] : no joint swelling [Itching] : no itching

## 2023-09-18 ENCOUNTER — APPOINTMENT (OUTPATIENT)
Dept: CARDIOLOGY | Facility: CLINIC | Age: 63
End: 2023-09-18
Payer: COMMERCIAL

## 2023-09-18 PROCEDURE — 93306 TTE W/DOPPLER COMPLETE: CPT

## 2023-09-18 PROCEDURE — 93015 CV STRESS TEST SUPVJ I&R: CPT

## 2023-12-08 ENCOUNTER — APPOINTMENT (OUTPATIENT)
Dept: CARDIOLOGY | Facility: CLINIC | Age: 63
End: 2023-12-08
Payer: COMMERCIAL

## 2023-12-08 ENCOUNTER — NON-APPOINTMENT (OUTPATIENT)
Age: 63
End: 2023-12-08

## 2023-12-08 VITALS
HEIGHT: 63 IN | WEIGHT: 109 LBS | HEART RATE: 84 BPM | OXYGEN SATURATION: 98 % | DIASTOLIC BLOOD PRESSURE: 56 MMHG | BODY MASS INDEX: 19.31 KG/M2 | SYSTOLIC BLOOD PRESSURE: 100 MMHG

## 2023-12-08 VITALS
HEIGHT: 63 IN | RESPIRATION RATE: 14 BRPM | HEART RATE: 84 BPM | OXYGEN SATURATION: 98 % | TEMPERATURE: 98 F | DIASTOLIC BLOOD PRESSURE: 56 MMHG | WEIGHT: 109 LBS | BODY MASS INDEX: 19.31 KG/M2 | SYSTOLIC BLOOD PRESSURE: 100 MMHG

## 2023-12-08 DIAGNOSIS — R94.31 ABNORMAL ELECTROCARDIOGRAM [ECG] [EKG]: ICD-10-CM

## 2023-12-08 PROCEDURE — 99214 OFFICE O/P EST MOD 30 MIN: CPT | Mod: 25

## 2023-12-08 PROCEDURE — 93000 ELECTROCARDIOGRAM COMPLETE: CPT

## 2024-01-26 ENCOUNTER — APPOINTMENT (OUTPATIENT)
Dept: CARDIOLOGY | Facility: CLINIC | Age: 64
End: 2024-01-26
Payer: COMMERCIAL

## 2024-01-26 ENCOUNTER — NON-APPOINTMENT (OUTPATIENT)
Age: 64
End: 2024-01-26

## 2024-01-26 VITALS
HEART RATE: 88 BPM | HEIGHT: 63 IN | OXYGEN SATURATION: 99 % | SYSTOLIC BLOOD PRESSURE: 114 MMHG | DIASTOLIC BLOOD PRESSURE: 66 MMHG

## 2024-01-26 DIAGNOSIS — R07.9 CHEST PAIN, UNSPECIFIED: ICD-10-CM

## 2024-01-26 DIAGNOSIS — I34.0 NONRHEUMATIC MITRAL (VALVE) INSUFFICIENCY: ICD-10-CM

## 2024-01-26 PROCEDURE — 99214 OFFICE O/P EST MOD 30 MIN: CPT | Mod: 25

## 2024-01-26 PROCEDURE — G2211 COMPLEX E/M VISIT ADD ON: CPT

## 2024-01-26 PROCEDURE — 93000 ELECTROCARDIOGRAM COMPLETE: CPT

## 2024-01-26 NOTE — DISCUSSION/SUMMARY
[FreeTextEntry1] : symptomatic PSVT PAC s with palpitation symptoms with BB will continue  atenolol 25 mg po daily will obtain stress test  Fatigue SOB possible decondition sleep deprivation, sleep study mild Sleep apnea ,?? anxiety ? depression ,  brief SVT on monitor  Abnormal EKG RBBB compared old ekg done in 2013 ,Normal ventricular function , normal stress test , stable  MVP , moderate MR , mild to moderate AI clinically compensated no significant change from prior study   Pure hypercholesteremia  HDL 63 10 years risk < 3% would recommend diet restriction , no evidence of carotid disease  will obtain blood work report that was done with primary   follow up after 4 months.   [EKG obtained to assist in diagnosis and management of assessed problem(s)] : EKG obtained to assist in diagnosis and management of assessed problem(s)

## 2024-01-26 NOTE — REVIEW OF SYSTEMS
[Chills] : no chills [Blurry Vision] : no blurred vision [Earache] : no earache [SOB] : no shortness of breath [Leg Claudication] : no intermittent leg claudication [Syncope] : no syncope [Cough] : no cough [Abdominal Pain] : no abdominal pain [Vomiting] : no vomiting [Heartburn] : no heartburn [Change in Appetite] : no change in appetite [Dysphagia] : no dysphagia [Joint Swelling] : no joint swelling [Itching] : no itching [Negative] : Heme/Lymph

## 2024-01-26 NOTE — HISTORY OF PRESENT ILLNESS
[FreeTextEntry1] : Patient  with  hx of Hyperlipidemia  , treated NHL with chemotherapy with radiation 1996 ,came for follow up says she is still feeling palpitation randomly lasts for few seconds , isolated beats ,feels better with exercise     patient had normal exercise stress test with good exercise   no ST changes , echo showed normal ventricular systolic function , moderate eecentric MR , mild to moderate AI , mild TR   IASA   patient is sleeping well , increase fluid intake ,  improved palpitations with medication  able to sleep well , less worried than before ,   patient had recent blood work , was told to have normal TSH , normal blood count ,elevated      HDL 65  normal TG    patient does give hx of snoring ,  sleep study showed mild sleep apnea , patient  zio patch showed  sinus rhythm rare PACS , brief SVT associated with her symptoms  of not feeling comfortable   her blood pressure is low normal range   patient denies any leg edema  but does feel cold in extremities   patient had echo showed MVP  moderate MR , mild to moderate AI  normal LVEF without significant change from prior , patient normal nuclear stress test .in 2021

## 2024-01-26 NOTE — CARDIOLOGY SUMMARY
[de-identified] : 1/26/24 sinus rhythm RBBB right axis  [de-identified] : zio  sinus rhythm rare PACS  brief SVT   had symptoms around that time    rare PVCS  [de-identified] : 9/18/23 10.1  METS 87 % MPHR  no significant arrhythmia or ST changes on exercise stress test   normal myocardial perfusion scan  EF 70% in 2021  [de-identified] : 9/18/23 EF 60-65 %  MVP Of anterior mitral leaflet Moderate MR ,mild to moderate AI,Mild TR RVSP 30 mm hg , no significant change  [de-identified] : 6/7/21  mild intimal thickening

## 2024-05-19 ENCOUNTER — RX RENEWAL (OUTPATIENT)
Age: 64
End: 2024-05-19

## 2024-05-19 RX ORDER — ATENOLOL 25 MG/1
25 TABLET ORAL DAILY
Qty: 30 | Refills: 1 | Status: ACTIVE | COMMUNITY
Start: 2022-12-29 | End: 1900-01-01

## 2024-06-14 ENCOUNTER — APPOINTMENT (OUTPATIENT)
Dept: CARDIOLOGY | Facility: CLINIC | Age: 64
End: 2024-06-14
Payer: COMMERCIAL

## 2024-06-14 ENCOUNTER — NON-APPOINTMENT (OUTPATIENT)
Age: 64
End: 2024-06-14

## 2024-06-14 VITALS
HEIGHT: 63 IN | DIASTOLIC BLOOD PRESSURE: 64 MMHG | SYSTOLIC BLOOD PRESSURE: 134 MMHG | BODY MASS INDEX: 19.31 KG/M2 | HEART RATE: 81 BPM | WEIGHT: 109 LBS | OXYGEN SATURATION: 99 %

## 2024-06-14 DIAGNOSIS — I45.10 UNSPECIFIED RIGHT BUNDLE-BRANCH BLOCK: ICD-10-CM

## 2024-06-14 DIAGNOSIS — I34.1 NONRHEUMATIC MITRAL (VALVE) PROLAPSE: ICD-10-CM

## 2024-06-14 DIAGNOSIS — E78.2 MIXED HYPERLIPIDEMIA: ICD-10-CM

## 2024-06-14 DIAGNOSIS — R00.2 PALPITATIONS: ICD-10-CM

## 2024-06-14 DIAGNOSIS — I47.10 SUPRAVENTRICULAR TACHYCARDIA, UNSPECIFIED: ICD-10-CM

## 2024-06-14 PROCEDURE — 93000 ELECTROCARDIOGRAM COMPLETE: CPT

## 2024-06-14 PROCEDURE — G2211 COMPLEX E/M VISIT ADD ON: CPT | Mod: NC,1L

## 2024-06-14 PROCEDURE — 99214 OFFICE O/P EST MOD 30 MIN: CPT | Mod: 25

## 2024-06-14 RX ORDER — ROSUVASTATIN CALCIUM 5 MG/1
5 TABLET, FILM COATED ORAL
Qty: 1 | Refills: 1 | Status: ACTIVE | COMMUNITY
Start: 2024-06-14 | End: 1900-01-01

## 2024-06-14 RX ORDER — ALENDRONATE SODIUM 35 MG/1
TABLET ORAL
Refills: 0 | Status: ACTIVE | COMMUNITY

## 2024-06-14 NOTE — HISTORY OF PRESENT ILLNESS
[FreeTextEntry1] : Patient  with  hx of Hyperlipidemia  , treated NHL with chemotherapy with radiation 1996 ,came for follow up says she is still feeling palpitation randomly lasts for few seconds , isolated beats ,feels better with exercise     patient had normal exercise stress test with good exercise   no ST changes , echo showed normal ventricular systolic function , moderate eccentric MR , mild to moderate AI , mild TR   IASA   patient is sleeping well , increased fluid intake ,  improved palpitations with medication  able to sleep well , less worried than before ,   patient had recent blood work , was told to have normal TSH , normal blood count ,elevated      HDL 65  normal TG    patient does give hx of snoring ,  sleep study showed mild sleep apnea , patient  zio patch showed  sinus rhythm rare PACS , brief SVT associated with her symptoms  of not feeling comfortable   her blood pressure is low normal range   patient denies any leg edema  but does feel cold in extremities   patient had echo showed MVP  moderate MR , mild to moderate AI  normal LVEF without significant change from prior , patient normal nuclear stress test .in 2021

## 2024-06-14 NOTE — DISCUSSION/SUMMARY
[FreeTextEntry1] : symptomatic PSVT PAC s with palpitation symptoms with BB will continue  atenolol 25 mg po daily   Fatigue SOB possible decondition sleep deprivation, sleep study mild Sleep apnea ,?? anxiety ? depression ,  brief SVT on monitor  Abnormal EKG RBBB compared old ekg done in 2013 ,Normal ventricular function , normal stress test , stable  MVP , moderate MR , mild to moderate AI clinically compensated no significant change from prior study   Pure hypercholesteremia   HDL 63 10 years risk < 3% would recommend diet restriction, add low dose statin crestor 5 mg po daily  blood work after 6 weeks    follow up after 4 months.   [EKG obtained to assist in diagnosis and management of assessed problem(s)] : EKG obtained to assist in diagnosis and management of assessed problem(s)

## 2024-06-14 NOTE — CARDIOLOGY SUMMARY
[de-identified] : 1/26/24 sinus rhythm RBBB right axis  [de-identified] : zio  sinus rhythm rare PACS  brief SVT   had symptoms around that time    rare PVCS  [de-identified] : 9/18/23 10.1  METS 87 % MPHR  no significant arrhythmia or ST changes on exercise stress test   normal myocardial perfusion scan  EF 70% in 2021  [de-identified] : 9/18/23 EF 60-65 %  MVP Of anterior mitral leaflet Moderate MR ,mild to moderate AI,Mild TR RVSP 30 mm hg , no significant change  [de-identified] : 6/7/21  mild intimal thickening

## 2024-08-12 NOTE — H&P ADULT - NSHPLABSRESULTS_GEN_ALL_CORE
Labs:                        13.6   12.19 )-----------( 247      ( 26 Jan 2021 20:28 )             41.3   01-26    138  |  107  |  23  ----------------------------<  99  3.9   |  27  |  0.78    Ca    9.6      26 Jan 2021 20:28    TPro  7.9  /  Alb  4.0  /  TBili  0.4  /  DBili  x   /  AST  17  /  ALT  26  /  AlkPhos  88  01-26      Imaging:  < from: CT Abdomen and Pelvis w/ IV Cont (01.26.21 @ 21:45) >    EXAM:  CT ABDOMEN AND PELVIS IC                            PROCEDURE DATE:  01/26/2021      < end of copied text >    < from: CT Abdomen and Pelvis w/ IV Cont (01.26.21 @ 21:45) >    IMPRESSION:  Acute, uncomplicated appendicitis.    Small dependent stone in the urinary bladder.    < end of copied text > What Type Of Note Output Would You Prefer (Optional)?: Standard Output Hpi Title: Evaluation of Skin Lesions

## 2024-09-27 ENCOUNTER — APPOINTMENT (OUTPATIENT)
Dept: CARDIOLOGY | Facility: CLINIC | Age: 64
End: 2024-09-27
Payer: COMMERCIAL

## 2024-09-27 ENCOUNTER — NON-APPOINTMENT (OUTPATIENT)
Age: 64
End: 2024-09-27

## 2024-09-27 VITALS
WEIGHT: 109 LBS | HEART RATE: 78 BPM | HEIGHT: 63 IN | DIASTOLIC BLOOD PRESSURE: 58 MMHG | BODY MASS INDEX: 19.31 KG/M2 | OXYGEN SATURATION: 98 % | SYSTOLIC BLOOD PRESSURE: 112 MMHG

## 2024-09-27 VITALS
WEIGHT: 109 LBS | RESPIRATION RATE: 14 BRPM | BODY MASS INDEX: 19.31 KG/M2 | OXYGEN SATURATION: 98 % | TEMPERATURE: 98 F | HEART RATE: 78 BPM | HEIGHT: 63 IN | SYSTOLIC BLOOD PRESSURE: 112 MMHG | DIASTOLIC BLOOD PRESSURE: 58 MMHG

## 2024-09-27 DIAGNOSIS — E78.2 MIXED HYPERLIPIDEMIA: ICD-10-CM

## 2024-09-27 DIAGNOSIS — I47.10 SUPRAVENTRICULAR TACHYCARDIA, UNSPECIFIED: ICD-10-CM

## 2024-09-27 DIAGNOSIS — R94.31 ABNORMAL ELECTROCARDIOGRAM [ECG] [EKG]: ICD-10-CM

## 2024-09-27 DIAGNOSIS — I34.1 NONRHEUMATIC MITRAL (VALVE) PROLAPSE: ICD-10-CM

## 2024-09-27 PROCEDURE — 93000 ELECTROCARDIOGRAM COMPLETE: CPT

## 2024-09-27 PROCEDURE — 99214 OFFICE O/P EST MOD 30 MIN: CPT

## 2024-09-27 PROCEDURE — G2211 COMPLEX E/M VISIT ADD ON: CPT | Mod: NC

## 2024-09-27 NOTE — HISTORY OF PRESENT ILLNESS
[FreeTextEntry1] : Patient  with  hx of Hyperlipidemia  , treated NHL with chemotherapy with radiation 1996 ,came for follow up says she is fine , rarely she feels palpitation randomly lasts for few seconds , isolated beats ,feels better with exercise     patient had normal exercise stress test with good exercise   no ST changes , echo showed normal ventricular systolic function , moderate eccentric MR , mild to moderate AI , mild TR   IASA    patient had recent blood work , showed improved  lipids  LDL 62 normal LFTS   blood pressure is normal range     patient does give hx of snoring ,  sleep study showed mild sleep apnea , patient  zio patch showed  sinus rhythm rare PACS , brief SVT associated with her symptoms  of not feeling comfortable   her blood pressure is low normal range   patient denies any leg edema  but does feel cold in extremities   patient had echo showed MVP  moderate MR , mild to moderate AI  normal LVEF without significant change from prior , patient normal nuclear stress test .in 2021

## 2024-09-27 NOTE — REASON FOR VISIT
Acute on chronic systolic congestive heart failure [Symptom and Test Evaluation] : symptom and test evaluation [Arrhythmia/ECG Abnorrmalities] : arrhythmia/ECG abnormalities [Structural Heart and Valve Disease] : structural heart and valve disease [Hyperlipidemia] : hyperlipidemia [FreeTextEntry3] : Dr Damian

## 2024-09-27 NOTE — CARDIOLOGY SUMMARY
[de-identified] : 9/27/24  sinus rhythm RBBB right axis  [de-identified] : zio  sinus rhythm rare PACS  brief SVT   had symptoms around that time    rare PVCS  [de-identified] : 9/18/23 10.1  METS 87 % MPHR  no significant arrhythmia or ST changes on exercise stress test   normal myocardial perfusion scan  EF 70% in 2021  [de-identified] : 9/18/23 EF 60-65 %  MVP Of anterior mitral leaflet Moderate MR ,mild to moderate AI,Mild TR RVSP 30 mm hg , no significant change  [de-identified] : 6/7/21  mild intimal thickening

## 2024-09-27 NOTE — DISCUSSION/SUMMARY
[FreeTextEntry1] : symptomatic PSVT PAC s with palpitation symptoms with BB will continue  atenolol 25 mg po daily   Fatigue SOB possible decondition sleep deprivation, improved symptoms ,  sleep study mild Sleep apnea ,?? anxiety ? depression ,  brief SVT on monitor  Abnormal EKG RBBB compared old ekg done in 2013 ,Normal ventricular function , normal stress test , stable  MVP , moderate MR , mild to moderate AI clinically compensated no significant change from prior study   Pure hypercholesteremia  Improved  on medication LDL <70   HDL 63 would recommend diet restriction, low dose statin Crestor 5 mg po kamlesh   follow up after 4 months.   [EKG obtained to assist in diagnosis and management of assessed problem(s)] : EKG obtained to assist in diagnosis and management of assessed problem(s)

## 2024-09-27 NOTE — PHYSICAL EXAM
[Well Developed] : well developed [Well Nourished] : well nourished [No Acute Distress] : no acute distress [Normal Conjunctiva] : normal conjunctiva [Normal Venous Pressure] : normal venous pressure [No Carotid Bruit] : no carotid bruit [Normal S1, S2] : normal S1, S2 [No Murmur] : no murmur [No Rub] : no rub [No Gallop] : no gallop [Clear Lung Fields] : clear lung fields [Good Air Entry] : good air entry [Soft] : abdomen soft [Non Tender] : non-tender [Normal Bowel Sounds] : normal bowel sounds [Normal Gait] : normal gait [No Edema] : no edema [No Cyanosis] : no cyanosis [No Clubbing] : no clubbing [No Varicosities] : no varicosities [Normal Radial B/L] : normal radial B/L [Normal PT B/L] : normal PT B/L [Normal DP B/L] : normal DP B/L [No Rash] : no rash [No Skin Lesions] : no skin lesions [Moves all extremities] : moves all extremities [No Focal Deficits] : no focal deficits [Normal Speech] : normal speech [Alert and Oriented] : alert and oriented [Normal memory] : normal memory

## 2024-12-05 ENCOUNTER — RX RENEWAL (OUTPATIENT)
Age: 64
End: 2024-12-05

## 2025-03-07 ENCOUNTER — NON-APPOINTMENT (OUTPATIENT)
Age: 65
End: 2025-03-07

## 2025-03-07 ENCOUNTER — APPOINTMENT (OUTPATIENT)
Dept: CARDIOLOGY | Facility: CLINIC | Age: 65
End: 2025-03-07
Payer: COMMERCIAL

## 2025-03-07 VITALS
OXYGEN SATURATION: 96 % | SYSTOLIC BLOOD PRESSURE: 120 MMHG | HEIGHT: 63 IN | DIASTOLIC BLOOD PRESSURE: 62 MMHG | WEIGHT: 112 LBS | BODY MASS INDEX: 19.84 KG/M2 | HEART RATE: 79 BPM

## 2025-03-07 DIAGNOSIS — I47.10 SUPRAVENTRICULAR TACHYCARDIA, UNSPECIFIED: ICD-10-CM

## 2025-03-07 DIAGNOSIS — E78.2 MIXED HYPERLIPIDEMIA: ICD-10-CM

## 2025-03-07 DIAGNOSIS — R00.2 PALPITATIONS: ICD-10-CM

## 2025-03-07 DIAGNOSIS — I34.0 NONRHEUMATIC MITRAL (VALVE) INSUFFICIENCY: ICD-10-CM

## 2025-03-07 DIAGNOSIS — R94.31 ABNORMAL ELECTROCARDIOGRAM [ECG] [EKG]: ICD-10-CM

## 2025-03-07 DIAGNOSIS — R07.9 CHEST PAIN, UNSPECIFIED: ICD-10-CM

## 2025-03-07 PROCEDURE — 93000 ELECTROCARDIOGRAM COMPLETE: CPT

## 2025-03-07 PROCEDURE — 99214 OFFICE O/P EST MOD 30 MIN: CPT

## 2025-03-07 PROCEDURE — G2211 COMPLEX E/M VISIT ADD ON: CPT | Mod: NC

## 2025-08-29 ENCOUNTER — APPOINTMENT (OUTPATIENT)
Dept: CARDIOLOGY | Facility: CLINIC | Age: 65
End: 2025-08-29
Payer: COMMERCIAL

## 2025-08-29 VITALS
HEIGHT: 63 IN | SYSTOLIC BLOOD PRESSURE: 128 MMHG | OXYGEN SATURATION: 96 % | WEIGHT: 110 LBS | DIASTOLIC BLOOD PRESSURE: 70 MMHG | BODY MASS INDEX: 19.49 KG/M2 | HEART RATE: 87 BPM

## 2025-08-29 DIAGNOSIS — R94.31 ABNORMAL ELECTROCARDIOGRAM [ECG] [EKG]: ICD-10-CM

## 2025-08-29 DIAGNOSIS — I34.0 NONRHEUMATIC MITRAL (VALVE) INSUFFICIENCY: ICD-10-CM

## 2025-08-29 DIAGNOSIS — E78.2 MIXED HYPERLIPIDEMIA: ICD-10-CM

## 2025-08-29 DIAGNOSIS — I47.10 SUPRAVENTRICULAR TACHYCARDIA, UNSPECIFIED: ICD-10-CM

## 2025-08-29 DIAGNOSIS — I34.1 NONRHEUMATIC MITRAL (VALVE) PROLAPSE: ICD-10-CM

## 2025-08-29 DIAGNOSIS — R00.2 PALPITATIONS: ICD-10-CM

## 2025-08-29 PROCEDURE — G2211 COMPLEX E/M VISIT ADD ON: CPT | Mod: NC

## 2025-08-29 PROCEDURE — 93306 TTE W/DOPPLER COMPLETE: CPT

## 2025-08-29 PROCEDURE — 93000 ELECTROCARDIOGRAM COMPLETE: CPT

## 2025-08-29 PROCEDURE — 99214 OFFICE O/P EST MOD 30 MIN: CPT
